# Patient Record
Sex: FEMALE | Race: WHITE
[De-identification: names, ages, dates, MRNs, and addresses within clinical notes are randomized per-mention and may not be internally consistent; named-entity substitution may affect disease eponyms.]

---

## 2017-02-10 ENCOUNTER — HOSPITAL ENCOUNTER (INPATIENT)
Dept: HOSPITAL 61 - ER | Age: 57
LOS: 1 days | Discharge: LEFT BEFORE DISCHARGE | DRG: 392 | End: 2017-02-11
Attending: INTERNAL MEDICINE | Admitting: INTERNAL MEDICINE
Payer: COMMERCIAL

## 2017-02-10 VITALS — HEIGHT: 65 IN | WEIGHT: 250.56 LBS | BODY MASS INDEX: 41.74 KG/M2

## 2017-02-10 DIAGNOSIS — K59.00: Primary | ICD-10-CM

## 2017-02-10 DIAGNOSIS — E11.40: ICD-10-CM

## 2017-02-10 DIAGNOSIS — Z88.8: ICD-10-CM

## 2017-02-10 DIAGNOSIS — Z89.511: ICD-10-CM

## 2017-02-10 DIAGNOSIS — F32.9: ICD-10-CM

## 2017-02-10 DIAGNOSIS — K21.9: ICD-10-CM

## 2017-02-10 DIAGNOSIS — I10: ICD-10-CM

## 2017-02-10 LAB
ALBUMIN SERPL-MCNC: 2.2 G/DL (ref 3.4–5)
ALP SERPL-CCNC: 129 U/L (ref 46–116)
ALT SERPL-CCNC: 21 U/L (ref 14–59)
ANION GAP SERPL CALC-SCNC: 11 MMOL/L (ref 6–14)
AST SERPL-CCNC: 22 U/L (ref 15–37)
BACTERIA #/AREA URNS HPF: (no result) /HPF
BASOPHILS # BLD AUTO: 0.1 X10^3/UL (ref 0–0.2)
BASOPHILS NFR BLD: 1 % (ref 0–3)
BILIRUB DIRECT SERPL-MCNC: < 0.1 MG/DL (ref 0–0.2)
BILIRUB SERPL-MCNC: 0.2 MG/DL (ref 0.2–1)
BILIRUB UR QL STRIP: NEGATIVE
BUN SERPL-MCNC: 33 MG/DL (ref 7–20)
CALCIUM SERPL-MCNC: 9 MG/DL (ref 8.5–10.1)
CHLORIDE SERPL-SCNC: 98 MMOL/L (ref 98–107)
CO2 SERPL-SCNC: 24 MMOL/L (ref 21–32)
CREAT SERPL-MCNC: 1.2 MG/DL (ref 0.6–1)
EOSINOPHIL NFR BLD: 3 % (ref 0–3)
ERYTHROCYTE [DISTWIDTH] IN BLOOD BY AUTOMATED COUNT: 17.9 % (ref 11.5–14.5)
GFR SERPLBLD BASED ON 1.73 SQ M-ARVRAT: 46.3 ML/MIN
GLUCOSE SERPL-MCNC: 442 MG/DL (ref 70–99)
GLUCOSE UR STRIP-MCNC: >=1000 MG/DL
HCT VFR BLD CALC: 35.1 % (ref 36–47)
HGB BLD-MCNC: 11.2 G/DL (ref 12–15.5)
LYMPHOCYTES # BLD: 2.6 X10^3/UL (ref 1–4.8)
LYMPHOCYTES NFR BLD AUTO: 17 % (ref 24–48)
MCH RBC QN AUTO: 25 PG (ref 25–35)
MCHC RBC AUTO-ENTMCNC: 32 G/DL (ref 31–37)
MCV RBC AUTO: 80 FL (ref 79–100)
MONOCYTES NFR BLD: 6 % (ref 0–9)
NEUTROPHILS NFR BLD AUTO: 74 % (ref 31–73)
NITRITE UR QL STRIP: NEGATIVE
PH UR STRIP: 5.5 [PH]
PLATELET # BLD AUTO: 446 X10^3/UL (ref 140–400)
POTASSIUM SERPL-SCNC: 5 MMOL/L (ref 3.5–5.1)
PROT SERPL-MCNC: 6.9 G/DL (ref 6.4–8.2)
PROT UR STRIP-MCNC: >=300 MG/DL
RBC # BLD AUTO: 4.39 X10^6/UL (ref 3.5–5.4)
RBC #/AREA URNS HPF: (no result) /HPF (ref 0–2)
SODIUM SERPL-SCNC: 133 MMOL/L (ref 136–145)
SP GR UR STRIP: >=1.03
SQUAMOUS #/AREA URNS LPF: (no result) /LPF
UROBILINOGEN UR-MCNC: 0.2 MG/DL
WBC # BLD AUTO: 15.6 X10^3/UL (ref 4–11)
WBC #/AREA URNS HPF: (no result) /HPF (ref 0–4)

## 2017-02-10 PROCEDURE — 96374 THER/PROPH/DIAG INJ IV PUSH: CPT

## 2017-02-10 PROCEDURE — 81001 URINALYSIS AUTO W/SCOPE: CPT

## 2017-02-10 PROCEDURE — 80048 BASIC METABOLIC PNL TOTAL CA: CPT

## 2017-02-10 PROCEDURE — 96376 TX/PRO/DX INJ SAME DRUG ADON: CPT

## 2017-02-10 PROCEDURE — 80076 HEPATIC FUNCTION PANEL: CPT

## 2017-02-10 PROCEDURE — 96361 HYDRATE IV INFUSION ADD-ON: CPT

## 2017-02-10 PROCEDURE — 82947 ASSAY GLUCOSE BLOOD QUANT: CPT

## 2017-02-10 PROCEDURE — 36415 COLL VENOUS BLD VENIPUNCTURE: CPT

## 2017-02-10 PROCEDURE — 83605 ASSAY OF LACTIC ACID: CPT

## 2017-02-10 PROCEDURE — 74177 CT ABD & PELVIS W/CONTRAST: CPT

## 2017-02-10 PROCEDURE — 85027 COMPLETE CBC AUTOMATED: CPT

## 2017-02-10 NOTE — RAD
--------------------PQRS STATEMENT------------------- 

One or more of the following individualized dose reduction techniques were

utilized for this study: 1.Automated exposure control. 2.Adjustment of the

mA and/orkVaccording to patient size. 3.Use of iterative reconstruction 

technique. 

------------------------------------------------------------------ 



Indication:PRIOR STUDY FROM 2011 SENT...GAVE 60ML OMNI..PT C/O 

CONSTAPATION WITH RECTAL PAIN Reason: abd pain/n/constipation / Spl. 

Instructions: / History: 

Comparison: 11/03/2011 

Technique: multiple contiguous axial images were obtained through the 

abdomen and pelvis after intravenous administration of iodinated contrast.

Coronal and sagittal reformations were created. 

 

Findings: 

The lung bases are clear. The heart size is normal. The liver is enlarged 

measuring 22.5 centimeters. No focal lesions are identified. The 

gallbladder is nondistended. The pancreas is unremarkable. The spleen and 

adrenal glands are within normal limits. The kidneys demonstrate no 

hydronephrosis or mass. The abdominal aorta is normal in caliber. There is

no ascites or adenopathy. The appendix is normal. There is a rectal fecal 

impaction. The urinary bladder is distended. No destructive osseous lesion

is identified. 

 

 

Impression: 

- Rectal fecal impaction.

- Severe distention of the urinary bladder.

- Consider the possibility of neurologic dysfunction given the presence of

these concurrent findings.

 

 

 

 

 

 

Electronically signed by: Travis Steward (Feb 10, 2017 22:01:48)

## 2017-02-11 VITALS — SYSTOLIC BLOOD PRESSURE: 113 MMHG | DIASTOLIC BLOOD PRESSURE: 48 MMHG

## 2017-02-11 NOTE — ACF
Admission Forms Criteria


                                                                         


                           ABDOMINAL PAIN





Clinical Indications for Admission to Inpatient Care


 


                                                                               

      (Place 'X' for any and all applicable criteria):





Admission is indicated for ANY ONE of the following(1)(2)(3)(4)(5):


[X ]I.      Inpatient admission required rather than observation care (Also use 

Abdominal Pain: Observation Care, 


           as appropriate) because of ANY ONE of the following:


            [X ]a)   Severe pain requiring acute inpatient management


            [ ]b)    Identification of etiology/finding that requires inpatient 

care (eg, aortic dissection, free air)


            [ ]c)    Absent bowel sounds with complete ileus(6)  


            [ ]d)    Suspected toxic megacolon


            [ ]e)    Severe electrolyte abnormalities requiring inpatient care


            [ ]f)     High fever or infection requiring inpatient admission as 

indicated by ANY ONE of following(7)(8):


                       [ ] i)    Appropriate outpatient or observational care 

antimicrobial treatment unavailable, not effective, or not feasible


                       [ ] ii)   Documented bacteremia 


                       [ ] iii)  Temperature > 104.9 degrees F (oral)


                       [ ] iv)  T >103.1 F (oral) or < 96.8 F(rectal) that does 

not respond to all emergency treatment measures


            [ ]g)     Signs of intestinal obstruction [B] 


            [ ]h)     Hemodynamic instability


            [ ]i)      IV fluid to replace significant ongoing losses (greater 

than 3 L/m2 per day) (12)(13)


            [ ]j)      Percutaneous or open drainage (eg, abscess, biliary tract

) procedures


            [ ]k)     Parenteral nutrition regimen that must be implemented on 

inpatient basis   


            [ ]l)      Other condition,treatment or monitoring requiring 

inpatient admission.


[ ]II.      Peritoneal signs present


[ ]III.     Surgery needed that cannot be performed on an ambulatory basis.


[ ]IV.    Evaluation requires patient to not eat or drink for extended period (

eg, more than 24 hours).





[ ]V.     Contraindications and/or Inappropriate clinical situations for 

Observational Care in patients with


           abdominal pain, when ANY ONE of the following is required:


           [ ]a)  Thorough evaluation is required to prevent catastrophic 

events due to delays in diagnosing  


                   (e.g.Mesenteric ischemia) 1,3


           [ ]b)  Patient with severe pathology or with chronic symptoms 

unlikely to improve in the ED stay (3)


[ ]VI.    General contraindications and/or Inappropriate clinical situations 

for Observational Care in patients


           with abdominal pain, when ANY ONE of the following is required:


           [ ]a)   Prediction of prolongation of LOS based on ANY ONE of the 

following may be considered as 


                    a contraindication for observational care 2, 3, 4, 5, 6, 7, 

8, 9, 10, 11


                    [ ]i)    Age > 65 yrs.


                    [ ]ii)   Patient arriving by ambulance


                    [ ]iii)  Patient with high acuity


                    [ ]iv)  Patient requiring vital sign monitoring


                    [ ]v)   Patient on IV medication


           [ ]b)   Systolic blood pressures  180mmHg 3,12


           [ ]c)   Patient with altered mental status including delirium and 

other alteration of consciousness, (3)


           [ ]d)   Patient whose discharge disposition will be to a skilled 

nursing home or rehabilitation home should 


                    not be managed in Emergency Department Observation Unit. 

CMS rule requires 3 days hospital stay before such placement.3,13


           [ ]e)   Patient with failure to thrive due to broad array of 

etiologies 3,16,17


           [ ]f)    Inability to ambulate 3,14








Extended stay beyond goal length of stay may be needed for(2)(3):


[ ]a)   Persistent abdominal pain with suspected intra-abdominal process


[ ]b)   Diagnosed condition requiring continued stay (e.g., pancreatitis, 

complicated diverticulitis)                                            


[ ]c)   Surgery (e.g., colectomy)                


    





The original [facility] content created by [employer] has been revised. 


The portions of the content which have been revised are identified through the 

use of italic text or in bold, and [facility] 


has neither reviewed nor approved the modified material.All other unmodified 

content is copyright  [facility].





Please see references footnoted in the original [facility] edition 

2016


Admission Criteria Met?:  Yes








IRAIS ROCHA Feb 11, 2017 00:45

## 2017-02-11 NOTE — ED.ADGEN
Past Medical History


Past Medical History:  Depression, Diabetes-Type II, GERD, Hypertension


Additional Past Medical Histor:  Neuropathy


Past Surgical History:  Other


Additional Past Surgical Histo:  Right BKA


Alcohol Use:  None


Drug Use:  None





Adult General


Chief Complaint


Chief Complaint:  CONTISPATION





HPI


HPI


Patient is a 56  year old woman, history of type 2 diabetes mellitus, status 

post right BKA, constipation, GERD, presents emergency Department with 

complaint of abdominal pain and constipation. Patient states that she's had 

difficulty with passing stool for some time. States that she be any spirits 

increasing abdominal pain, and rectal pain after attempting to manually 

disimpact herself at home. She states that she had taken "a lot of things to 

get me unstuck, and and it seemed to work but now it isn't". She states that 

she did have very loose stools earlier today, but is now constipated again, and 

feeling pressure and pain in the rectal region. No chest pain or shortness of 

breath, fevers or chills, no urinary complaints, no vomiting.





Review of Systems


Review of Systems


Constitutional:  Denies fever or chills. []


Eyes:  Denies change in visual acuity. []


HENT:  Denies nasal congestion or sore throat. [] 


Respiratory:  Denies cough or shortness of breath. [] 


Cardiovascular:  Denies chest pain or edema. [] 


GI: Her quadrant abdominal pain, nausea, no vomiting, no bloody stools, diarrhea

, and constipation.


:  Denies dysuria. [] 


Musculoskeletal:  Denies back pain or joint pain. [] 


Integument:  Denies rash. [] 


Neurologic:  Denies headache, focal weakness or sensory changes. [] 


Endocrine:  Denies polyuria or polydipsia. [] 


Lymphatic:  Denies swollen glands. [] 


Psychiatric:  Denies depression or anxiety. []





Current Medications


Current Medications





 Current Medications








 Medications


  (Trade)  Dose


 Ordered  Sig/Lori  Start Time


 Stop Time Status Last Admin


Dose Admin


 


 Fentanyl Citrate


  (Fentanyl 2ml


 Vial)  25 mcg  PRN Q15MIN  PRN  2/10/17 21:45


 17 21:44  2/10/17 22:41


25 MCG


 


 Info


  (Do NOT chart on


 this entry -- for


 MONITORING)  1 each  PRN DAILY  PRN  2/10/17 20:45


 17 20:44   


 


 


 Iohexol


  (Omnipaque 300


 Mg/ml)  60 ml  1X  ONCE  2/10/17 20:45


 2/10/17 20:46 DC 2/10/17 21:06


60 ML


 


 Lidocaine HCl


  (Glydo


  (Lidocaine) Jelly)  1 winsome  1X  ONCE  2/10/17 22:45


 2/10/17 22:46 DC 2/10/17 22:41


1 WINOSME


 


 Sodium Chloride


  (Iv Sodium


 Chloride 0.9%


 1000ml Bag)  1,000 ml @ 


 1,000 mls/hr  1X  ONCE  2/10/17 20:00


 2/10/17 20:59 DC 2/10/17 20:25


1,000 MLS/HR











Allergies


Allergies





 Allergies








Coded Allergies Type Severity Reaction Last Updated Verified


 


  insulin detemir Allergy Unknown  2/10/17 Yes











Physical Exam


Physical Exam





Constitutional: Well developed, well nourished, mild distress secondary to 

rectal pain, non-toxic appearance. []


HENT: Normocephalic, atraumatic, bilateral external ears normal, oropharynx 

moist, no oral exudates, nose normal. []


Eyes: PERRLA, EOMI, conjunctiva normal, no discharge. [] 


Neck: Normal range of motion, no tenderness, supple, no stridor. [] 


Cardiovascular:Heart rate regular rhythm, no murmur , S1, S2, rubs or gallops. [

]


Lungs & Thorax:  Bilateral breath sounds clear to auscultation, no wheezing, no 

rhonchi, rales. No chest tenderness or crepitus. []


Abdomen: Bowel sounds normal, obese, soft, tenderness to palpation throughout 

the abdomen, no masses, no pulsatile masses. [] 


Skin: Warm, dry, no erythema, no rash. [] 


Back: No tenderness, no CVA tenderness. [] 


Extremities: No tenderness, no cyanosis, no clubbing, ROM intact, no edema. [] 


Neurologic: Alert and oriented X 3, normal motor function, normal sensory 

function, no focal deficits noted. []


Psychologic: Affect normal, judgement normal, mood normal. []





Patient with rectal irritation noted, no active bleeding or discharge, patient 

with significant pain with attempts to perform rectal examination and 

disimpaction, with irritation noted, consistent with her previous attempts at 

disimpaction. Patient with soft brown stool in vault.





Current Patient Data


Vital Signs





 Vital Signs








  Date Time  Temp Pulse Resp B/P Pulse Ox O2 Delivery O2 Flow Rate FiO2


 


2/10/17 22:30  108 20 146/62 96 Room Air  


 


2/10/17 18:15 98.7       





 98.7       








Lab Values





 Laboratory Tests








Test


  2/10/17


19:30 2/10/17


19:40 2/10/17


20:40


 


Glucose (Fingerstick)


  400mg/dL


(70-99)  H 


  


 


 


White Blood Count


  


  15.6x10^3/uL


(4.0-11.0)  H 


 


 


Red Blood Count


  


  4.39x10^6/uL


(3.50-5.40) 


 


 


Hemoglobin


  


  11.2g/dL


(12.0-15.5)  L 


 


 


Hematocrit


  


  35.1%


(36.0-47.0)  L 


 


 


Mean Corpuscular Volume  80fL ()   


 


Mean Corpuscular Hemoglobin  25pg (25-35)   


 


Mean Corpuscular Hemoglobin


Concent 


  32g/dL (31-37)


  


 


 


Red Cell Distribution Width


  


  17.9%


(11.5-14.5)  H 


 


 


Platelet Count


  


  446x10^3/uL


(140-400)  H 


 


 


Neutrophils (%) (Auto)  74% (31-73)  H 


 


Lymphocytes (%) (Auto)  17% (24-48)  L 


 


Monocytes (%) (Auto)  6% (0-9)   


 


Eosinophils (%) (Auto)  3% (0-3)   


 


Basophils (%) (Auto)  1% (0-3)   


 


Neutrophils # (Auto)


  


  11.5x10^3uL


(1.8-7.7)  H 


 


 


Lymphocytes # (Auto)


  


  2.6x10^3/uL


(1.0-4.8) 


 


 


Monocytes # (Auto)


  


  0.9x10^3/uL


(0.0-1.1) 


 


 


Eosinophils # (Auto)


  


  0.4x10^3/uL


(0.0-0.7) 


 


 


Basophils # (Auto)


  


  0.1x10^3/uL


(0.0-0.2) 


 


 


Urine Collection Type  U cath   


 


Urine Color  Yellow   


 


Urine Clarity  Clear   


 


Urine pH  5.5   


 


Urine Specific Gravity  >=1.030   


 


Urine Protein


  


  >=300mg/dL


(NEG-TRACE) 


 


 


Urine Glucose (UA)


  


  >=1000mg/dL


(NEG) 


 


 


Urine Ketones (Stick)


  


  Negativemg/dL


(NEG) 


 


 


Urine Blood  Trace (NEG)   


 


Urine Nitrite


  


  Negative (NEG)


  


 


 


Urine Bilirubin


  


  Negative (NEG)


  


 


 


Urine Urobilinogen Dipstick


  


  0.2mg/dL (0.2


mg/dL) 


 


 


Urine Leukocyte Esterase


  


  Negative (NEG)


  


 


 


Urine RBC  1-2/HPF (0-2)   


 


Urine WBC


  


  5-10/HPF (0-4)


  


 


 


Urine Squamous Epithelial


Cells 


  Few/LPF  


  


 


 


Urine Amorphous Sediment  Present/HPF   


 


Urine Bacteria


  


  Few/HPF


(0-FEW) 


 


 


Urine Hyaline Casts  Few/HPF   


 


Urine Mucus  Slight/LPF   


 


Sodium Level


  


  133mmol/L


(136-145)  L 


 


 


Potassium Level


  


  5.0mmol/L


(3.5-5.1) 


 


 


Chloride Level


  


  98mmol/L


() 


 


 


Carbon Dioxide Level


  


  24mmol/L


(21-32) 


 


 


Anion Gap  11 (6-14)   


 


Blood Urea Nitrogen


  


  33mg/dL (7-20)


H 


 


 


Creatinine


  


  1.2mg/dL


(0.6-1.0)  H 


 


 


Estimated GFR


(Cockcroft-Gault) 


  46.3  


  


 


 


Glucose Level


  


  442mg/dL


(70-99)  H 


 


 


Calcium Level


  


  9.0mg/dL


(8.5-10.1) 


 


 


Total Bilirubin


  


  0.2mg/dL


(0.2-1.0) 


 


 


Direct Bilirubin


  


  < 0.1mg/dL


(0.0-0.2) 


 


 


Aspartate Amino Transferase


(AST) 


  22U/L (15-37)  


  


 


 


Alanine Aminotransferase (ALT)  21U/L (14-59)   


 


Alkaline Phosphatase


  


  129U/L


()  H 


 


 


Total Protein


  


  6.9g/dL


(6.4-8.2) 


 


 


Albumin


  


  2.2g/dL


(3.4-5.0)  L 


 


 


Lactic Acid Level


  


  


  0.8mmol/L


(0.4-2.0)





 Laboratory Tests


2/10/17 19:40








 Laboratory Tests


2/10/17 19:40














EKG


EKG


[]





Radiology/Procedures


Radiology/Procedures


[]


 [facility]


 [address] [place] [address] [phone]


 


 IMAGING REPORT





 Signed





PATIENT: ANNALISE LENZ ACCOUNT: [id number] MRN#: [id number]


: 1960 LOCATION: ER AGE: 56


SEX: F EXAM DT: 02/10/17 ACCESSION#: [id number]


STATUS: REG ER ORD. PHYSICIAN: ROBI FU DO 


REASON: abd pain/n/constipation


PROCEDURE: ABD PELV W/ IV CONTRAST ONLY











--------------------PQRS STATEMENT------------------- 


One or more of the following individualized dose reduction techniques were


utilized for this study: 1.Automated exposure control. 2.Adjustment of the


mA and/orkVaccording to patient size. 3.Use of iterative reconstruction 


technique. 


------------------------------------------------------------------ 





Indication:PRIOR STUDY FROM  SENT...GAVE 60ML OMNI..PT C/O 


CONSTAPATION WITH RECTAL PAIN Reason: abd pain/n/constipation / Spl. 


Instructions: / History: 


Comparison: 2011 


Technique: multiple contiguous axial images were obtained through the 


abdomen and pelvis after intravenous administration of iodinated contrast.


Coronal and sagittal reformations were created. 


 


Findings: 


The lung bases are clear. The heart size is normal. The liver is enlarged 


measuring 22.5 centimeters. No focal lesions are identified. The 


gallbladder is nondistended. The pancreas is unremarkable. The spleen and 


adrenal glands are within normal limits. The kidneys demonstrate no 


hydronephrosis or mass. The abdominal aorta is normal in caliber. There is


no ascites or adenopathy. The appendix is normal. There is a rectal fecal 


impaction. The urinary bladder is distended. No destructive osseous lesion


is identified. 


 


 


Impression: 


- Rectal fecal impaction.


- Severe distention of the urinary bladder.


- Consider the possibility of neurologic dysfunction given the presence of


these concurrent findings.


 


 


 


 


 


 


Electronically signed by: Travis Steward (Feb 10, 2017 22:01:48)














DICTATED and SIGNED BY:     TRAVIS STEWARD MD


DATE:     02/10/17 2201





CC: MARY ELLEN DUMONT; ROBI FU DO ~





Course & Med Decision Making


Course & Med Decision Making


Pertinent Labs and Imaging studies reviewed. (See chart for details)





Due to complaint of pain, and constipation, with a mild leukocytosis with a CT 

of abdomen and pelvis obtained. Reveal evidence of a large fecal impaction, and 

distention of the bladder. Patient with a small amount of tach. The urine, but 

denies any urinary complaints, is voiding without issue in the ED. 

Neurologically intact. We did attempt to disimpact the patient in the emergency 

department, she was unable to tolerate these attempts secondary to discomfort. 

She states she has tried enemas at home without success as she is unable to 

manage performing the enema, with her amputated leg. I did discuss findings as 

above with Dr. Madden, on-call for the patient's primary care provider, as 

patient is complaining of persistent pain, nausea, and we have failed to nearly 

the problem in the ED, and provided successful in relation home, will admit to 

the hospital for symptom management, continued evaluation, and placement of 

bowel regimen to promote disimpaction and resolution of her symptoms. Patient 

accepted to Dr. Madden's service as a full admission to the medical surgical 

floor with plan as above.





Dragon Disclaimer


Dragon Disclaimer


This electronic medical record was generated, in whole or in part, using a 

voice recognition dictation system.





Departure


Impression:  


 Primary Impression:  


 Constipation


 Additional Impression:  


 Abdominal pain


Disposition:   ADMITTED AS INPATIENT


Admitting Physician:  Diego Madden


Condition:  IMPROVED





Problem Qualifiers








ROBI FU DO 2017 01:33

## 2020-02-10 ENCOUNTER — HOSPITAL ENCOUNTER (OUTPATIENT)
Dept: HOSPITAL 61 - SURG | Age: 60
Discharge: HOME | End: 2020-02-10
Payer: MEDICARE

## 2020-02-10 VITALS — DIASTOLIC BLOOD PRESSURE: 53 MMHG | SYSTOLIC BLOOD PRESSURE: 97 MMHG

## 2020-02-10 DIAGNOSIS — I13.0: ICD-10-CM

## 2020-02-10 DIAGNOSIS — I50.9: ICD-10-CM

## 2020-02-10 DIAGNOSIS — Z79.899: ICD-10-CM

## 2020-02-10 DIAGNOSIS — Z80.41: ICD-10-CM

## 2020-02-10 DIAGNOSIS — M19.90: ICD-10-CM

## 2020-02-10 DIAGNOSIS — Z88.8: ICD-10-CM

## 2020-02-10 DIAGNOSIS — E11.22: ICD-10-CM

## 2020-02-10 DIAGNOSIS — K44.9: ICD-10-CM

## 2020-02-10 DIAGNOSIS — K57.30: ICD-10-CM

## 2020-02-10 DIAGNOSIS — Z98.890: ICD-10-CM

## 2020-02-10 DIAGNOSIS — K64.0: ICD-10-CM

## 2020-02-10 DIAGNOSIS — K21.9: ICD-10-CM

## 2020-02-10 DIAGNOSIS — Z83.3: ICD-10-CM

## 2020-02-10 DIAGNOSIS — K25.9: ICD-10-CM

## 2020-02-10 DIAGNOSIS — Z80.0: ICD-10-CM

## 2020-02-10 DIAGNOSIS — Z82.49: ICD-10-CM

## 2020-02-10 DIAGNOSIS — F32.9: ICD-10-CM

## 2020-02-10 DIAGNOSIS — N18.9: ICD-10-CM

## 2020-02-10 DIAGNOSIS — Z86.73: ICD-10-CM

## 2020-02-10 DIAGNOSIS — Z79.4: ICD-10-CM

## 2020-02-10 DIAGNOSIS — F41.9: ICD-10-CM

## 2020-02-10 DIAGNOSIS — Z80.3: ICD-10-CM

## 2020-02-10 DIAGNOSIS — D64.9: Primary | ICD-10-CM

## 2020-02-10 DIAGNOSIS — Z87.891: ICD-10-CM

## 2020-02-10 PROCEDURE — 82962 GLUCOSE BLOOD TEST: CPT

## 2020-02-10 PROCEDURE — 45378 DIAGNOSTIC COLONOSCOPY: CPT

## 2020-02-10 NOTE — PDOC4
PROCEDURE


Procedure


Colonoscopy





Ind: anemia/blood in stool.





Meds: per anesthesia





Findings:





FERNANDO normal.





--'Scope advanced to cecum.  Prep good.  Mucosa normal.  Scattered diverticula 

from transverse to sigmoid.  No polyps, masses, etc.  Internal hemorrhoids on 

retroflex.





Sawyer. well.





IMP: Diverticulosis.


       Internal hemorrhoids; likely the site of overt bleeding.





REC: Reassure.


         If iron-deficient, should consider EGD.


         Repeat colon in 10 years.











DAVID MELVIN MD          Feb 10, 2020 14:22

## 2021-09-15 ENCOUNTER — HOSPITAL ENCOUNTER (EMERGENCY)
Dept: HOSPITAL 61 - ER | Age: 61
Discharge: HOME | End: 2021-09-15
Payer: MEDICARE

## 2021-09-15 VITALS — BODY MASS INDEX: 36.73 KG/M2 | WEIGHT: 220.46 LBS | HEIGHT: 65 IN

## 2021-09-15 VITALS — DIASTOLIC BLOOD PRESSURE: 71 MMHG | SYSTOLIC BLOOD PRESSURE: 162 MMHG
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
## 2021-09-15 DIAGNOSIS — S80.862A: Primary | ICD-10-CM

## 2021-09-15 DIAGNOSIS — K59.00: ICD-10-CM

## 2021-09-15 DIAGNOSIS — E11.40: ICD-10-CM
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
## 2021-09-15 DIAGNOSIS — K21.9: ICD-10-CM

## 2021-09-15 DIAGNOSIS — I10: ICD-10-CM
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
## 2021-09-15 DIAGNOSIS — Z88.8: ICD-10-CM

## 2021-09-15 DIAGNOSIS — S80.861A: ICD-10-CM
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
## 2021-09-15 LAB
ALBUMIN SERPL-MCNC: 3.3 G/DL (ref 3.4–5)
ALBUMIN/GLOB SERPL: 0.8 {RATIO} (ref 1–1.7)
ALP SERPL-CCNC: 249 U/L (ref 46–116)
ALT SERPL-CCNC: 15 U/L (ref 14–59)
ANION GAP SERPL CALC-SCNC: 10 MMOL/L (ref 6–14)
AST SERPL-CCNC: 7 U/L (ref 15–37)
BASOPHILS # BLD AUTO: 0.1 X10^3/UL (ref 0–0.2)
BASOPHILS NFR BLD: 1 % (ref 0–3)
BILIRUB SERPL-MCNC: 0.3 MG/DL (ref 0.2–1)
BUN SERPL-MCNC: 35 MG/DL (ref 7–20)
BUN/CREAT SERPL: 7 (ref 6–20)
CALCIUM SERPL-MCNC: 8.1 MG/DL (ref 8.5–10.1)
CHLORIDE SERPL-SCNC: 96 MMOL/L (ref 98–107)
CO2 SERPL-SCNC: 27 MMOL/L (ref 21–32)
CREAT SERPL-MCNC: 4.7 MG/DL (ref 0.6–1)
EOSINOPHIL NFR BLD: 0.3 X10^3/UL (ref 0–0.7)
EOSINOPHIL NFR BLD: 4 % (ref 0–3)
ERYTHROCYTE [DISTWIDTH] IN BLOOD BY AUTOMATED COUNT: 16.9 % (ref 11.5–14.5)
GFR SERPLBLD BASED ON 1.73 SQ M-ARVRAT: 9.4 ML/MIN
GLUCOSE SERPL-MCNC: 223 MG/DL (ref 70–99)
HCT VFR BLD CALC: 31.7 % (ref 36–47)
HGB BLD-MCNC: 10.5 G/DL (ref 12–15.5)
LIPASE: 106 U/L (ref 73–393)
LYMPHOCYTES # BLD: 1.2 X10^3/UL (ref 1–4.8)
LYMPHOCYTES NFR BLD AUTO: 16 % (ref 24–48)
MCH RBC QN AUTO: 30 PG (ref 25–35)
MCHC RBC AUTO-ENTMCNC: 33 G/DL (ref 31–37)
MCV RBC AUTO: 90 FL (ref 79–100)
MONO #: 0.6 X10^3/UL (ref 0–1.1)
MONOCYTES NFR BLD: 8 % (ref 0–9)
NEUT #: 5.3 X10^3/UL (ref 1.8–7.7)
NEUTROPHILS NFR BLD AUTO: 71 % (ref 31–73)
PLATELET # BLD AUTO: 343 X10^3/UL (ref 140–400)
POTASSIUM SERPL-SCNC: 4.3 MMOL/L (ref 3.5–5.1)
PROT SERPL-MCNC: 7.6 G/DL (ref 6.4–8.2)
RBC # BLD AUTO: 3.52 X10^6/UL (ref 3.5–5.4)
SODIUM SERPL-SCNC: 133 MMOL/L (ref 136–145)
WBC # BLD AUTO: 7.5 X10^3/UL (ref 4–11)

## 2021-09-15 PROCEDURE — 80053 COMPREHEN METABOLIC PANEL: CPT

## 2021-09-15 PROCEDURE — 74022 RADEX COMPL AQT ABD SERIES: CPT

## 2021-09-15 PROCEDURE — 36415 COLL VENOUS BLD VENIPUNCTURE: CPT

## 2021-09-15 PROCEDURE — 85025 COMPLETE CBC W/AUTO DIFF WBC: CPT

## 2021-09-15 PROCEDURE — 93005 ELECTROCARDIOGRAM TRACING: CPT

## 2021-09-15 PROCEDURE — 83690 ASSAY OF LIPASE: CPT

## 2021-09-15 NOTE — RAD
EXAM: ABDOMEN 2 VIEWS WITH PA CHEST



History: Abdominal pain, chest pain 





COMPARISON: None available.



FINDINGS: 



Low lung volumes and technique accentuates the heart size and pulmonary vascularity. Feces and minima
l atelectasis or infiltrate bibasilar lungs. The bowel gas pattern appears unremarkable. Moderate marlin
unt of stool identified in the colon.



IMPRESSION:



1.  Minimal atelectasis or infiltrates bibasilar lungs.

2.  Unremarkable bowel gas pattern. Moderate amount of stool identified in the colon.



Electronically signed by: Armando Segovia MD (9/15/2021 4:36 PM) [id number]

## 2021-09-15 NOTE — PHYS DOC
Past Medical History


Past Medical History:  Depression, Diabetes-Type II, GERD, Hypertension


Additional Past Medical Histor:  Neuropathy, fibromyalgia, dialysis


Past Surgical History:  Other


Additional Past Surgical Histo:  Right BKA, AV FISTULA LEFT ARM


Smoking Status:  Never Smoker


Alcohol Use:  None


Drug Use:  None





General Adult


EDM:


Chief Complaint:  ALLERGIC REACTION





HPI:


HPI:





Patient is a 60  year old female who presents to the ED today complaining of 

fleabites that she has had since 2021.  Patient states she does not 

want to go home because she has cats and the whole they all have fleas.  EMS 

reports this patient was seen at [facility] a couple days 

ago for the same complaint.





Review of Systems:


Review of Systems:


Constitutional:   Denies fever or chills. []


Eyes:   Denies change in visual acuity. []


HENT:   Denies nasal congestion or sore throat. [] 


Respiratory:   Denies cough or shortness of breath. [] 


Cardiovascular:   Denies chest pain or edema. [] 


GI:   Denies abdominal pain, nausea, vomiting, bloody stools or diarrhea. [] 


:  Denies dysuria. [] 


Musculoskeletal:   Denies back pain or joint pain. [] 


Integument: Reports fleabites


Neurologic:   Denies headache, focal weakness or sensory changes. [] 


Psychiatric:  Denies depression or anxiety. []





Heart Score:


C/O Chest Pain:  N/A


Risk Factors:


Risk Factors:  DM, Current or recent (<one month) smoker, HTN, HLP, family 

history of CAD, obesity.


Risk Scores:


Score 0 - 3:  2.5% MACE over next 6 weeks - Discharge Home


Score 4 - 6:  20.3% MACE over next 6 weeks - Admit for Clinical Observation


Score 7 - 10:  72.7% MACE over next 6 weeks - Early Invasive Strategies





Current Medications:





Current Medications








 Medications


  (Trade)  Dose


 Ordered  Sig/Lori  Start Time


 Stop Time Status Last Admin


Dose Admin


 


 Hydroxyzine HCl


  (Atarax)  25 mg  1X  STAT  9/15/21 16:07


 9/15/21 16:10 DC 9/15/21 16:23


25 MG











Allergies:


Allergies:





Allergies








Coded Allergies Type Severity Reaction Last Updated Verified


 


  insulin detemir Allergy Intermediate  2/10/20 Yes











Physical Exam:


PE:





Constitutional: Well developed, well nourished, no acute distress, non-toxic 

appearance. []


HENT: Normocephalic, atraumatic, bilateral external ears normal, oropharynx 

moist, no oral exudates, nose normal. []


Eyes: PERRLA, EOMI, conjunctiva normal, no discharge. [] 


Neck: Normal range of motion, no tenderness, supple, no stridor. [] 


Cardiovascular:Heart rate regular rhythm, LUE with dialysis fistula with a 

positive thrill and bruit.


Lungs & Thorax:  Bilateral breath sounds clear to auscultation []


Abdomen: Bowel sounds normal, soft, no tenderness, no masses, no pulsatile 

masses. [] 


Skin: Small areas of redness consistent with flea bites noted to upper and lower

 extremities, trace amount on the face.


Back: No tenderness, no CVA tenderness. [] 


Extremities: No tenderness, no cyanosis, no clubbing,Right BKA


Neurologic: Alert and oriented X 3, normal motor function, normal sensory 

function, no focal deficits noted. []


Psychologic: Flat affect





Current Patient Data:


Vital Signs:





                                   Vital Signs








  Date Time  Temp Pulse Resp B/P (MAP) Pulse Ox O2 Delivery O2 Flow Rate FiO2


 


9/15/21 15:45 98.6 69 18 162/71 (101) 100 Room Air  





 98.6       











EKG:


EK interpreted by Dr. Garcia sinus rhythm heart rate 71 no STEMI []





Radiology/Procedures:


Radiology/Procedures:


[]PROCEDURE: ACUTE ABDOMEN SERIES








EXAM: ABDOMEN 2 VIEWS WITH PA CHEST





History: Abdominal pain, chest pain 








COMPARISON: None available.





FINDINGS: 





Low lung volumes and technique accentuates the heart size and pulmonary 

vascularity. Feces and minimal atelectasis or infiltrate bibasilar lungs. The 

bowel gas pattern appears unremarkable. Moderate amount of stool identified in 

the colon.





IMPRESSION:





1.  Minimal atelectasis or infiltrates bibasilar lungs.


2.  Unremarkable bowel gas pattern. Moderate amount of stool identified in the 

colon.





Electronically signed by: Armando Segovia MD (9/15/2021 4:36 PM) [id number]














DICTATED and SIGNED BY:     ARMANDO SEGOVIA MD


DATE:     09/15/21 [id number] 0





Course & Med Decision Making:


Course & Med Decision Making


Pertinent Labs and Imaging studies reviewed. (See chart for details)








This is a 60-year-old female patient presenting to the ED today complaining of 

fleabites since 2021.  EMS reports patient was seen at [facility] a couple days ago for the same complaint.  She comes to the ED 

stating she does not want to go back to her cat and house have fleas.  Informed 

patient she does not meet admission criteria with flea bites.  She had already 

reported she had dialysis yesterday and is supposed to be dialyzed tomorrow.  

She then started saying what if she has chest pain, and what if she has 

abdominal pain, we will admit her if she changes her complain.  Informed patient

 we can work her-up for chest pain and abdominal pain if the work up is negative

  she goes home. The she stated what if i say i have pneumonia.  I told her we 

can do a chest x-ray if it is negative she will be discharged.  She states she 

does not want to be around her 4 cats that are infested with fleas.  Informed 

patient to consider calling pest control and take the cats to the vet and have 

them treated for fleas.  She states she does not want to do that either.  

Informed patient I will send her home with hydroxyzine and requested  she 

follows up with her own PCP.  She states I need to call her PCP because she was 

told we can talk to the PCP and maybe she can be admitted.





We did an EKG on this patient which was negative.  We did an acute abdominal x-

ray which was negative.  He was noted to be constipated.  Sent her medicine to 

pharmacy for constipation.





[id number] spoke with the PCP, patient does not meet admission criteria for flea 

bites.





Nursing staff was able to talk with patient's sister who offered patient 

recommendation for tonight and even offered to take patient to dialysis 

tomorrow.  Patient was informed on discharge.





Patient's daughter called later requesting we do an ultrasound on patient 

because she was diagnosed with a dilated bile duct a month ago.  She also wanted

 labs done.





CBC no acute findings, CMP with normal AST and ALT, .  Ultrasound of the 

abdomen could not be done because is not considered an emergency test per 

radiologist.  Patient was discharged





Dragon Disclaimer:


Dragon Disclaimer:


This electronic medical record was generated, in whole or in part, using a voice

 recognition dictation system.





Departure


Departure


Impression:  


   Primary Impression:  


   Flea bite


   Qualified Codes:  W57.XXXS - Bitten or stung by nonvenomous insect and other 

   nonvenomous arthropods, sequela


   Additional Impression:  


   Constipation


   Qualified Codes:  K59.00 - Constipation, unspecified


Disposition:  01 HOME / SELF CARE / HOMELESS


Condition:  STABLE


Referrals:  


ULISES VALENCIA MD (PCP)


follow up next week


Patient Instructions:  Constipation, Adult, Insect Bite, Easy-to-Read





Additional Instructions:  


Please consider calling pest control to get rid of fluids in your house.  Please

 consider taking the cats to animal [facility] to be treated for fleas.  Please 

consider hiring somebody to clean the entire house.  You are also constipated.  

Use the prescribed medications as ordered.


Scripts


Hydroxyzine Hcl (HYDROXYZINE HCL) 25 Mg Tablet


1 TAB PO TID, #30 TAB


   Prov: JONY MATT         9/15/21 


Bisacodyl (BISACODYL) 5 Mg Tablet.dr


5 MG PO PRN DAILY PRN for CONSTIPATION, #20 TAB 0 Refills


   Prov: JONY MATT         9/15/21











JONY MATT            Sep 15, 2021 18:07

## 2021-09-15 NOTE — EKG
[facility]

              [address] [place] [address]

Test Date:    2021-09-15               Test Time:    16:26:53

Pat Name:     ANNALISE LENZ            Department:   

Patient ID:   [id number]           Room:          

Gender:       F                        Technician:   

:          [id number]               Requested By: JONY MATT

Order Number: [id number]           Reading MD:     

                                 Measurements

Intervals                              Axis          

Rate:         71                       P:            0

AL:           212                      QRS:          32

QRSD:         102                      T:            112

QT:           484                                    

QTc:          532                                    

                           Interpretive Statements

SINUS RHYTHM

LEFT ATRIAL ABNORMALITY

T ABNORMALITY IN HIGH LATERAL LEADS

PROLONGED QT

ABNORMAL ECG

RI6.02

No previous ECG available for comparison